# Patient Record
Sex: FEMALE | URBAN - METROPOLITAN AREA
[De-identification: names, ages, dates, MRNs, and addresses within clinical notes are randomized per-mention and may not be internally consistent; named-entity substitution may affect disease eponyms.]

---

## 2021-08-20 ENCOUNTER — OFFICE VISIT (OUTPATIENT)
Dept: URBAN - METROPOLITAN AREA TELEHEALTH 2 | Facility: TELEHEALTH | Age: 51
End: 2021-08-20
Payer: COMMERCIAL

## 2021-08-20 VITALS — WEIGHT: 230 LBS | BODY MASS INDEX: 36.96 KG/M2 | HEIGHT: 66 IN

## 2021-08-20 DIAGNOSIS — K81.0 ACUTE CHOLECYSTITIS: ICD-10-CM

## 2021-08-20 DIAGNOSIS — R11.2 NAUSEA AND VOMITING IN ADULT: ICD-10-CM

## 2021-08-20 PROCEDURE — 99244 OFF/OP CNSLTJ NEW/EST MOD 40: CPT | Performed by: INTERNAL MEDICINE

## 2021-08-20 RX ORDER — ONDANSETRON HYDROCHLORIDE 4 MG/1
1 TABLET AS NEEDED TABLET, FILM COATED ORAL TWICE A DAY
Qty: 28 TABLET | Refills: 0 | OUTPATIENT
Start: 2021-08-20

## 2021-08-20 RX ORDER — AMLODIPINE BESYLATE 10 MG/1
1 TABLET TABLET ORAL ONCE A DAY
Status: ACTIVE | COMMUNITY

## 2021-08-20 NOTE — HPI-TODAY'S VISIT:
Ms. Carr is a 52 yo F presenting for consultation for abdominal pain and gallstones and is referred by Dr. Fabian Weems. A copy of this report will be sent to Dr. Weems.  She has been having nausea and vomiting most days for 3 weeks. No blood in the vomit. Symptoms are worse with eating. She had some chills. No objective fever. She also upper abdominal pain. It is sharp and can be 8/10 in severity. The pain worsens her nausea. It is worse with eating and movement. She went to urgent care 2 weeks ago. She had a negative COVID test and she was told she had "stomach flu." She had an ultrasound done on 21 that showed acute cholecystitis, per patient.    Patient seen today via telehealth by agreement and consent of patient in light of current COVID-19 pandemic. I used video conferencing during the visit. The patient encounter is appropriate and reasonable under the circumstances given the patient's particular presentation at this time. The patient has been advised of the followin) the potential risks and limitations of this mode of treatment (including but not limited to the absence of in-person examination); 2) the right to refuse telehealth services at any point without affecting the right to future care; 3) the right to receive in-person services, included immediately after this consultation if an urgent need arises; 4) information, including identifiable images or information from this telehealth consult, will only be shared in accordance with HIPPA regulations. Any and all of the patient's and/or patient's family member's questions on this issue have been answered. The patient has verbally consented to be treated via telehealth services. The patient has also been advised to contact this office for worsening conditions or problems, and seek emergency medical treatment and/or call 911 if the patient deems either necessary.   More than half of the face-to-face time used for counseling and coordination of care.

## 2021-08-22 LAB
A/G RATIO: 1.3
ALBUMIN: 4.7
ALKALINE PHOSPHATASE: 195
ALT (SGPT): 28
AST (SGOT): 24
BASO (ABSOLUTE): 0
BASOS: 1
BILIRUBIN, TOTAL: 0.2
BUN/CREATININE RATIO: 14
BUN: 13
CALCIUM: 9.9
CARBON DIOXIDE, TOTAL: 22
CHLORIDE: 98
CREATININE: 0.96
EGFR IF AFRICN AM: 79
EGFR IF NONAFRICN AM: 68
EOS (ABSOLUTE): 0.4
EOS: 8
GLOBULIN, TOTAL: 3.7
GLUCOSE: 261
HEMATOCRIT: 40.5
HEMATOLOGY COMMENTS:: (no result)
HEMOGLOBIN: 12.7
IMMATURE CELLS: (no result)
IMMATURE GRANS (ABS): 0
IMMATURE GRANULOCYTES: 0
LYMPHS (ABSOLUTE): 1.5
LYMPHS: 29
MCH: 26.5
MCHC: 31.4
MCV: 85
MONOCYTES(ABSOLUTE): 0.3
MONOCYTES: 6
NEUTROPHILS (ABSOLUTE): 3.1
NEUTROPHILS: 56
NRBC: (no result)
PLATELETS: 312
POTASSIUM: 4.6
PROTEIN, TOTAL: 8.4
RBC: 4.79
RDW: 14.8
SODIUM: 137
WBC: 5.4

## 2021-10-18 ENCOUNTER — OFFICE VISIT (OUTPATIENT)
Dept: URBAN - METROPOLITAN AREA TELEHEALTH 2 | Facility: TELEHEALTH | Age: 51
End: 2021-10-18

## 2021-10-18 RX ORDER — ONDANSETRON HYDROCHLORIDE 4 MG/1
1 TABLET AS NEEDED TABLET, FILM COATED ORAL TWICE A DAY
Qty: 28 TABLET | Refills: 0 | Status: ACTIVE | COMMUNITY
Start: 2021-08-20

## 2021-10-18 RX ORDER — AMLODIPINE BESYLATE 10 MG/1
1 TABLET TABLET ORAL ONCE A DAY
Status: ACTIVE | COMMUNITY

## 2021-10-18 NOTE — HPI-TODAY'S VISIT:
Ms. Carr is a 50 yo F presenting for consultation for abdominal pain and gallstones and is referred by Dr. Fabian Weems. A copy of this report will be sent to Dr. Weems.  She has been having nausea and vomiting most days for 3 weeks. No blood in the vomit. Symptoms are worse with eating. She had some chills. No objective fever. She also upper abdominal pain. It is sharp and can be 8/10 in severity. The pain worsens her nausea. It is worse with eating and movement. She went to urgent care 2 weeks ago. She had a negative COVID test and she was told she had "stomach flu." She had an ultrasound done on 8/12/21 that showed acute cholecystitis, per patient.   Today:  October 18, 2021 The patient is here for f/u of abdominal pain

## 2021-11-23 ENCOUNTER — DASHBOARD ENCOUNTERS (OUTPATIENT)
Age: 51
End: 2021-11-23

## 2021-12-06 ENCOUNTER — OFFICE VISIT (OUTPATIENT)
Dept: URBAN - METROPOLITAN AREA TELEHEALTH 2 | Facility: TELEHEALTH | Age: 51
End: 2021-12-06

## 2021-12-06 RX ORDER — ONDANSETRON HYDROCHLORIDE 4 MG/1
1 TABLET AS NEEDED TABLET, FILM COATED ORAL TWICE A DAY
Qty: 28 TABLET | Refills: 0 | Status: ACTIVE | COMMUNITY
Start: 2021-08-20

## 2021-12-06 RX ORDER — AMLODIPINE BESYLATE 10 MG/1
1 TABLET TABLET ORAL ONCE A DAY
Status: ACTIVE | COMMUNITY

## 2024-03-22 ENCOUNTER — OV NP (OUTPATIENT)
Dept: URBAN - METROPOLITAN AREA CLINIC 109 | Facility: CLINIC | Age: 54
End: 2024-03-22